# Patient Record
Sex: MALE | Race: WHITE | NOT HISPANIC OR LATINO | Employment: OTHER | URBAN - METROPOLITAN AREA
[De-identification: names, ages, dates, MRNs, and addresses within clinical notes are randomized per-mention and may not be internally consistent; named-entity substitution may affect disease eponyms.]

---

## 2020-01-28 ENCOUNTER — TRANSFERRED RECORDS (OUTPATIENT)
Dept: HEALTH INFORMATION MANAGEMENT | Facility: CLINIC | Age: 60
End: 2020-01-28

## 2020-02-26 ENCOUNTER — HOSPITAL ENCOUNTER (OUTPATIENT)
Dept: GENERAL RADIOLOGY | Facility: CLINIC | Age: 60
Discharge: HOME | End: 2020-02-26
Attending: OTOLARYNGOLOGY | Admitting: OTOLARYNGOLOGY
Payer: COMMERCIAL

## 2020-02-26 ENCOUNTER — TRANSFERRED RECORDS (OUTPATIENT)
Dept: HEALTH INFORMATION MANAGEMENT | Facility: CLINIC | Age: 60
End: 2020-02-26

## 2020-02-26 DIAGNOSIS — R42 DIZZINESS: ICD-10-CM

## 2020-02-26 PROCEDURE — 70120 X-RAY EXAM OF MASTOIDS: CPT

## 2020-02-28 ENCOUNTER — TRANSFERRED RECORDS (OUTPATIENT)
Dept: HEALTH INFORMATION MANAGEMENT | Facility: CLINIC | Age: 60
End: 2020-02-28

## 2020-06-04 ENCOUNTER — TRANSFERRED RECORDS (OUTPATIENT)
Dept: SURGERY | Facility: CLINIC | Age: 60
End: 2020-06-04

## 2020-06-10 ENCOUNTER — TRANSFERRED RECORDS (OUTPATIENT)
Dept: SURGERY | Facility: CLINIC | Age: 60
End: 2020-06-10

## 2020-06-12 ENCOUNTER — TRANSFERRED RECORDS (OUTPATIENT)
Dept: SURGERY | Facility: CLINIC | Age: 60
End: 2020-06-12

## 2020-07-27 ENCOUNTER — TRANSFERRED RECORDS (OUTPATIENT)
Dept: SURGERY | Facility: CLINIC | Age: 60
End: 2020-07-27

## 2020-08-18 ENCOUNTER — TRANSFERRED RECORDS (OUTPATIENT)
Dept: SURGERY | Facility: CLINIC | Age: 60
End: 2020-08-18

## 2021-06-18 ENCOUNTER — HOSPITAL ENCOUNTER (EMERGENCY)
Facility: CLINIC | Age: 61
Discharge: HOME | End: 2021-06-18
Attending: NURSE PRACTITIONER | Admitting: NURSE PRACTITIONER
Payer: COMMERCIAL

## 2021-06-18 VITALS
OXYGEN SATURATION: 99 % | HEART RATE: 88 BPM | WEIGHT: 175 LBS | BODY MASS INDEX: 21.76 KG/M2 | RESPIRATION RATE: 16 BRPM | DIASTOLIC BLOOD PRESSURE: 96 MMHG | SYSTOLIC BLOOD PRESSURE: 150 MMHG | HEIGHT: 75 IN | TEMPERATURE: 98.1 F

## 2021-06-18 DIAGNOSIS — R33.9 URINARY RETENTION: ICD-10-CM

## 2021-06-18 PROBLEM — K50.90 INFLAMMATORY BOWEL DISEASE (CROHN'S DISEASE) (H): Status: ACTIVE | Noted: 2021-02-22

## 2021-06-18 LAB
ALBUMIN UR-MCNC: 30 MG/DL
APPEARANCE UR: CLEAR
BILIRUB UR QL STRIP: NEGATIVE
COLOR UR AUTO: YELLOW
GLUCOSE UR STRIP-MCNC: NEGATIVE MG/DL
HGB UR QL STRIP: NEGATIVE
HYALINE CASTS #/AREA URNS LPF: 7 /LPF (ref 0–2)
KETONES UR STRIP-MCNC: 20 MG/DL
LEUKOCYTE ESTERASE UR QL STRIP: NEGATIVE
MUCOUS THREADS #/AREA URNS LPF: PRESENT /LPF
NITRATE UR QL: NEGATIVE
PH UR STRIP: 6.5 PH (ref 5–7)
RBC #/AREA URNS AUTO: 1 /HPF (ref 0–2)
SOURCE: ABNORMAL
SP GR UR STRIP: 1.03 (ref 1–1.03)
SQUAMOUS #/AREA URNS AUTO: <1 /HPF (ref 0–1)
UROBILINOGEN UR STRIP-MCNC: 2 MG/DL (ref 0–2)
WBC #/AREA URNS AUTO: <1 /HPF (ref 0–5)

## 2021-06-18 PROCEDURE — 99283 EMERGENCY DEPT VISIT LOW MDM: CPT

## 2021-06-18 PROCEDURE — 51702 INSERT TEMP BLADDER CATH: CPT

## 2021-06-18 PROCEDURE — 81001 URINALYSIS AUTO W/SCOPE: CPT | Performed by: EMERGENCY MEDICINE

## 2021-06-18 ASSESSMENT — ENCOUNTER SYMPTOMS
VOMITING: 0
DIFFICULTY URINATING: 1
ABDOMINAL PAIN: 0
FEVER: 0
CHILLS: 0
NAUSEA: 0

## 2021-06-18 ASSESSMENT — MIFFLIN-ST. JEOR: SCORE: 1694.42

## 2021-06-18 NOTE — ED TRIAGE NOTES
Presents with 3 days of urinary retention. Pt reports full bladder sensation and pain. Denies prostate or urinary hx. VSS on RA.

## 2021-06-19 ENCOUNTER — HOSPITAL ENCOUNTER (EMERGENCY)
Facility: CLINIC | Age: 61
Discharge: HOME | End: 2021-06-19
Attending: EMERGENCY MEDICINE | Admitting: EMERGENCY MEDICINE
Payer: COMMERCIAL

## 2021-06-19 ENCOUNTER — NURSE TRIAGE (OUTPATIENT)
Dept: NURSING | Facility: CLINIC | Age: 61
End: 2021-06-19

## 2021-06-19 VITALS
RESPIRATION RATE: 18 BRPM | TEMPERATURE: 97.9 F | HEART RATE: 63 BPM | SYSTOLIC BLOOD PRESSURE: 146 MMHG | DIASTOLIC BLOOD PRESSURE: 94 MMHG | OXYGEN SATURATION: 98 %

## 2021-06-19 DIAGNOSIS — R82.90 ABNORMAL URINALYSIS: ICD-10-CM

## 2021-06-19 DIAGNOSIS — R33.9 URINARY RETENTION: ICD-10-CM

## 2021-06-19 LAB
ALBUMIN UR-MCNC: 20 MG/DL
APPEARANCE UR: CLEAR
BILIRUB UR QL STRIP: NEGATIVE
COLOR UR AUTO: YELLOW
GLUCOSE UR STRIP-MCNC: NEGATIVE MG/DL
HGB UR QL STRIP: ABNORMAL
KETONES UR STRIP-MCNC: NEGATIVE MG/DL
LEUKOCYTE ESTERASE UR QL STRIP: ABNORMAL
MUCOUS THREADS #/AREA URNS LPF: PRESENT /LPF
NITRATE UR QL: NEGATIVE
PH UR STRIP: 8 PH (ref 5–7)
RBC #/AREA URNS AUTO: 165 /HPF (ref 0–2)
SOURCE: ABNORMAL
SP GR UR STRIP: 1.02 (ref 1–1.03)
UROBILINOGEN UR STRIP-MCNC: 2 MG/DL (ref 0–2)
WBC #/AREA URNS AUTO: 8 /HPF (ref 0–5)

## 2021-06-19 PROCEDURE — 99283 EMERGENCY DEPT VISIT LOW MDM: CPT

## 2021-06-19 PROCEDURE — 250N000013 HC RX MED GY IP 250 OP 250 PS 637: Performed by: EMERGENCY MEDICINE

## 2021-06-19 PROCEDURE — 87086 URINE CULTURE/COLONY COUNT: CPT | Performed by: EMERGENCY MEDICINE

## 2021-06-19 PROCEDURE — 81001 URINALYSIS AUTO W/SCOPE: CPT | Performed by: EMERGENCY MEDICINE

## 2021-06-19 RX ORDER — CEPHALEXIN 500 MG/1
500 CAPSULE ORAL 3 TIMES DAILY
Qty: 21 CAPSULE | Refills: 0 | Status: SHIPPED | OUTPATIENT
Start: 2021-06-19 | End: 2021-06-26

## 2021-06-19 RX ORDER — CEPHALEXIN 500 MG/1
500 CAPSULE ORAL ONCE
Status: COMPLETED | OUTPATIENT
Start: 2021-06-19 | End: 2021-06-19

## 2021-06-19 RX ADMIN — CEPHALEXIN 500 MG: 500 CAPSULE ORAL at 15:13

## 2021-06-19 ASSESSMENT — ENCOUNTER SYMPTOMS
FEVER: 0
NAUSEA: 0
ABDOMINAL PAIN: 0
VOMITING: 0
DIFFICULTY URINATING: 1

## 2021-06-19 NOTE — ED PROVIDER NOTES
History   Chief Complaint:  Sorensen Catheter Problem     HPI   Prashanth Hernandez is a 60 year old male who presents for evaluation of a Sorensen catheter problem. On 6/18 the patient was seen here in the ED for 4-5 days of urinary retention at which time a Sorensen catheter was placed and he was discharged with instructions to follow up with urology next week. This morning he noticed some blood coming out of the tip of his penis and later in the morning he started to have abnormal discharge form the penis, prompting him to come into the ED today. He reports that he has otherwise been feeling well and he denies any fever, nausea, vomiting, or abdominal pain.     Review of Systems   Constitutional: Negative for fever.   Gastrointestinal: Negative for abdominal pain, nausea and vomiting.   Genitourinary: Positive for difficulty urinating and discharge.   All other systems reviewed and are negative.      Allergies:  No known drug allergies      Medications:  Atarax  Lotrisone  Miralax  Valium    Past Medical History:    Crohn's disease   Small bowel obstruction      Past Surgical History:    Appendectomy  Laparotomy exploratory  Small bowel resection       Social History:  The patient presents to the ED alone.   The patient is a .     Physical Exam     Patient Vitals for the past 24 hrs:   BP Temp Pulse Resp SpO2   06/19/21 1209 (!) 146/94 97.9  F (36.6  C) 63 18 98 %       Physical Exam    Eyes:    Conjunctiva normal  Neck:    Supple, no meningismus.     CV:     Regular rate and rhythm.      No murmurs, rubs or gallops.     No lower extremity edema.  PULM:    Clear to auscultation bilateral.       No respiratory distress.      Good air exchange.  ABD:    Soft, non-distended.       No abdominal tenderness.     Bowel sounds normal.     No rebound, guarding or rigidity.     No CVA tenderness.   :   Sorensen catheter in place     Small amount of dried blood around the urethral opening      No purulent discharge  MSK:      No gross deformity to all four extremities.   LYMPH:   No cervical lymphadenopathy.  NEURO:   Alert.  Good muscular tone, no atrophy.   Skin:    Warm, dry and intact.    Psych:    Mood is good and affect is appropriate.      Emergency Department Course      Laboratory:  UA with Microscopic: Moderate blood, pH 8.0 high, Protein albumin 10, Trace leukocyte esterase, WBC 8 high,  high, Mucous present, o/w WNL   Urine Culture: Pending     Emergency Department Course:  Reviewed:  I reviewed nursing notes, vitals and past medical history    Assessments:  1342:  I obtained history and examined the patient as noted above.     1505: I updated and reassessed the patient.      Disposition:  The patient was discharged to home.     Impression & Plan     Medical Decision Makin-year-old male recently seen in the ED for urinary retention status post Sorensen catheter placement presents with concerns of mild bleeding and self-reports of purulent discharge at the urethral opening.  Catheter appears to be working appropriately.  No evidence of Sorensen catheter malfunction.  Urinalysis is equivocal with hematuria and a few white blood cells.  Given the novel symptoms, I will cover for early developing UTI with cephalexin pending urine culture.  If urine culture negative, patient may discontinue antibiotics.  Follow-up with urology as previously scheduled.     Diagnosis:    ICD-10-CM   1. Urinary retention  R33.9   2. Abnormal urinalysis  R82.90      Discharge Medications:  New Prescriptions    CEPHALEXIN (KEFLEX) 500 MG CAPSULE    Take 1 capsule (500 mg) by mouth 3 times daily for 7 days       Scribe Disclosure:  YI, Geovanni Galeana, am serving as a scribe at 1:42 PM on 2021 to document services personally performed by Kenneth Jones MD based on my observations and the provider's statements to me.         Kenneth Jones MD  [id number]

## 2021-06-19 NOTE — TELEPHONE ENCOUNTER
Patient says he was seen in the ED yesterday and given a catheter. He noticed some wetness yesterday; today there's some dried blood and minor discomfort. Catheter seems to be working -- reports steady increase of urine in bag.     Disposition: advised be seen w/i 4  Hrs.    Advised patient to be seen in ED or urgency room.   Reviewed care advice with caller per RN triage protocol guideline.  Advised to call back with worsening symptoms, concerns or questions. Caller verbalized understanding and says will monitor and go in if bleeding is worse.          Reason for Disposition    Bleeding around catheter (e.g., from penis or female urethra)    Additional Information    Negative: Shock suspected (e.g., cold/pale/clammy skin, too weak to stand, low BP, rapid pulse)    Negative: Sounds like a life-threatening emergency to the triager    Negative: [1] Catheter was accidentally pulled-out AND [2] bright red continuous bleeding    Negative: SEVERE abdominal pain    Negative: Fever > 100.4 F (38.0 C)    Negative: [1] Drinking very little AND [2] dehydration suspected (e.g., no urine > 12 hours, very dry mouth, very lightheaded)    Negative: Patient sounds very sick or weak to the triager    Negative: Catheter was accidentally pulled-out    Negative: [1] Catheter is broken AND [2] is not usable    Protocols used: URINARY CATHETER SYMPTOMS AND [id number]

## 2021-06-19 NOTE — ED TRIAGE NOTES
Pt was seen yesterday in the ED for urinary retention and was sent home with an indwelling blevins. Today pt noticed blood and pus on the head of his penis that's coming out around the tube. ABCs intact.

## 2021-06-19 NOTE — ED PROVIDER NOTES
"  History   Chief Complaint:  Urinary Retention       HPI   Prashanth Hernandez is a 60 year old male with history of Crohn's disease and small bowel obstruction who presents with urinary retention. He mentions for the past 4-5 days he has been having a difficult time producing urine. Today the discomfort had gotten worse. He is a  so he is active and drinks lots of water but does not produce urine. He mentions having anal swelling which he has gone to many hospitals and all had said different things. In the last year, he has had many digestive issues. He denies abdominal pain.     Review of Systems   Constitutional: Negative for chills and fever.   Gastrointestinal: Negative for abdominal pain, nausea and vomiting.   Genitourinary: Positive for decreased urine volume and difficulty urinating.   All other systems reviewed and are negative.    Allergies:  The patient has no known allergies.     Medications:  Augmentin  Dilaudid  Atarax  Lotrisone  Miralax  Valium    Past Medical History:    Crohn's disease  Small bowel obstruction    Past Surgical History:    Appendectomy  Laparotomy   Small bowel resection    Social History:  The patent presents alone.    Physical Exam     Patient Vitals for the past 24 hrs:   BP Temp Temp src Pulse Resp SpO2 Height Weight   06/18/21 2110 -- -- -- 88 -- 99 % -- --   06/18/21 1743 (!) 150/96 98.1  F (36.7  C) Temporal 73 16 100 % 1.905 m (6' 3\") 79.4 kg (175 lb)       Physical Exam  General: Alert, No obvious discomfort, well kept  Eyes: PERRL, conjunctivae pink no scleral icterus or conjunctival injection  ENT:   Moist mucus membranes, posterior oropharynx clear without erythema or exudates, No lymphadenopathy, Normal voice  Resp:  Lungs clear to auscultation bilaterally, no crackles/rubs/wheezes. Good air movement  CV:  Normal rate and rhythm, no murmurs/rubs/gallops  GI:  Abdomen soft and non-distended.  Normoactive BS.  No tenderness, guarding or rebound, No masses  Skin: "  Warm, dry.  No rashes or petechiae  Musculoskeletal: No peripheral edema or calf tenderness, Normal gross ROM   Neuro: Alert and oriented to person/place/time, normal sensation  Psychiatric: Normal affect, cooperative, good eye contact    Emergency Department Course     Laboratory:  UA with microscopic: Ketons: 20 (A), Protein Albumin: 30 (A), Mucous: Present (A), Hyaline: 7(H) o/w WNL        Emergency Department Course:    Reviewed:  I reviewed nursing notes, vitals, past medical history and care everywhere    Assessments:  2010 I obtained history and examined the patient as noted above.     2051 I rechecked the patient and explained findings.       Disposition:  The patient was discharged to home.       Impression & Plan     Medical Decision Making:  Prashanth Hernandez is a 60 year old male who presents today for evaluation of urinary retention.  He states over the last week or so he has had increasing difficulty urinating and particularly over the last 3 days.  Today he has been unable to empty his bladder and therefore presented for evaluation.  He does not have a known history of BPH or other urinary problems but does have some intestinal issues.  He uses a stool softener for this.  Does not feel constipated at this time.  Prior to my evaluation he had already had a Sorensen catheter placed and 900 cc of urine was drained.  Patient had no symptoms on my evaluation.  There was no signs of urinary tract infection.  Unclear as to the source of his retention at this time.  Plan will be to leave Sorensen catheter in place until he can follow-up with urology.  Patient is comfortable with this plan.  No indication for antibiotics.  Note further testing or imaging indicated.  He appears to be safe and appropriate for outpatient management follow-up and is discharged home.    Diagnosis:    ICD-10-CM    1. Urinary retention  R33.9        Scribe Disclosure:  Sabrina RICHMOND, am serving as a scribe at 8:10 PM on 6/18/2021  to document services personally performed by Ronen Patterson APRN based on my observations and the provider's statements to me.          Ronen Patterson APRN CNP  06/18/21 [id number]

## 2021-06-20 LAB
BACTERIA SPEC CULT: NO GROWTH
Lab: NORMAL
SPECIMEN SOURCE: NORMAL

## 2021-06-21 ENCOUNTER — NURSE TRIAGE (OUTPATIENT)
Dept: NURSING | Facility: CLINIC | Age: 61
End: 2021-06-21

## 2021-06-21 NOTE — TELEPHONE ENCOUNTER
Patient seen at  ER on Friday for retention of urine. A catheter was placed, and patient was advised to follow up with Urologist per ER @ [facility].    Patient calling today, and states he is unable to see urologist for 2 weeks. He has his PCP at [facility], and he was advised to call [facility] , and see if they could get him in sooner, and maybe also see Urologist sooner.  Patient also concerned because of the expense to go back to ER to get catheter removed.    Patient agreed with POC. And states he will call his PCP now.    Allie Bullard, RN RN  [facility] Triage/refill nurse    Reason for Disposition    Patient wants to be seen    Protocols used: URINARY CATHETER SYMPTOMS AND [id number]

## 2021-06-22 ENCOUNTER — TELEPHONE (OUTPATIENT)
Dept: EMERGENCY MEDICINE | Facility: CLINIC | Age: 61
End: 2021-06-22

## 2021-06-22 NOTE — TELEPHONE ENCOUNTER
"[facility] Emergency Department Lab result notification:    Reason for call  No Growth urine culture treated  Lab Result  Final urine culture report shows \"NO GROWTH\" and is NEGATIVE.  [facility] Emergency Dept discharge antibiotic: Cephalexin (Keflex) 500 mg capsule, 1 capsule (500 mg) by mouth 3 times daily for 7 days.  [facility] Emergency Dept discharge Rx antibiotic for UTI only (Yes/No): Yes  Patient took antibiotic within 3 days prior to urine culture collection (Yes/no): No  Recommendations in treatment per [facility] ED Lab result Urine culture protocol.  ED visit Date: 6/19/21  Symptoms reported at ED visit Urinary Retention        HPI   Prashanth Hernandez is a 60 year old male with history of Crohn's disease and small bowel obstruction who presents with urinary retention. He mentions for the past 4-5 days he has been having a difficult time producing urine. Today the discomfort had gotten worse. He is a  so he is active and drinks lots of water but does not produce urine. He mentions having anal swelling which he has gone to many hospitals and all had said different things. In the last year, he has had many digestive issues. He denies abdominal pain.    Miscellaneous information      Current symptoms  9:52 Spoke with patient and did review the results with him. He does have an appointment with his primary clinic [facility] today and will discuss his result and any need for continuing treatment with that provider.  Recommendations/Instructions  Patient notified of lab result and treatment recommendations.    Contact your PCP clinic or return to the Emergency department if your:    Symptoms return.    Symptoms worsen or other concerning symptom's.    Ruthann Pratt  [facility] [employer]  Emergency Dept Lab Result RN  Ph# [phone]       "

## 2021-10-15 ENCOUNTER — TRANSFERRED RECORDS (OUTPATIENT)
Dept: SURGERY | Facility: CLINIC | Age: 61
End: 2021-10-15

## 2022-03-14 ENCOUNTER — TRANSFERRED RECORDS (OUTPATIENT)
Dept: SURGERY | Facility: CLINIC | Age: 62
End: 2022-03-14
Payer: COMMERCIAL

## 2022-03-18 ENCOUNTER — HOSPITAL ENCOUNTER (OUTPATIENT)
Dept: CT IMAGING | Facility: CLINIC | Age: 62
Discharge: HOME | End: 2022-03-18
Attending: INTERNAL MEDICINE | Admitting: INTERNAL MEDICINE
Payer: COMMERCIAL

## 2022-03-18 DIAGNOSIS — Z87.19 HISTORY OF CROHN'S DISEASE: ICD-10-CM

## 2022-03-18 DIAGNOSIS — R14.0 BLOATING: ICD-10-CM

## 2022-03-18 PROCEDURE — 250N000011 HC RX IP 250 OP 636: Performed by: INTERNAL MEDICINE

## 2022-03-18 PROCEDURE — 250N000009 HC RX 250: Performed by: INTERNAL MEDICINE

## 2022-03-18 PROCEDURE — 74177 CT ABD & PELVIS W/CONTRAST: CPT

## 2022-03-18 RX ORDER — IOPAMIDOL 755 MG/ML
500 INJECTION, SOLUTION INTRAVASCULAR ONCE
Status: COMPLETED | OUTPATIENT
Start: 2022-03-18 | End: 2022-03-18

## 2022-03-18 RX ADMIN — SODIUM CHLORIDE 59 ML: 9 INJECTION, SOLUTION INTRAVENOUS at 09:30

## 2022-03-18 RX ADMIN — IOPAMIDOL 88 ML: 755 INJECTION, SOLUTION INTRAVENOUS at 09:30

## 2023-09-19 ENCOUNTER — APPOINTMENT (OUTPATIENT)
Dept: MRI IMAGING | Facility: CLINIC | Age: 63
End: 2023-09-19
Attending: PHYSICIAN ASSISTANT
Payer: COMMERCIAL

## 2023-09-19 ENCOUNTER — HOSPITAL ENCOUNTER (EMERGENCY)
Facility: CLINIC | Age: 63
Discharge: HOME | End: 2023-09-19
Attending: PHYSICIAN ASSISTANT | Admitting: PHYSICIAN ASSISTANT
Payer: COMMERCIAL

## 2023-09-19 VITALS
OXYGEN SATURATION: 100 % | RESPIRATION RATE: 16 BRPM | SYSTOLIC BLOOD PRESSURE: 143 MMHG | HEART RATE: 62 BPM | TEMPERATURE: 98.5 F | DIASTOLIC BLOOD PRESSURE: 71 MMHG

## 2023-09-19 DIAGNOSIS — R42 DIZZINESS: ICD-10-CM

## 2023-09-19 LAB
ANION GAP SERPL CALCULATED.3IONS-SCNC: 7 MMOL/L (ref 7–15)
BASOPHILS # BLD AUTO: 0 10E3/UL (ref 0–0.2)
BASOPHILS NFR BLD AUTO: 1 %
BUN SERPL-MCNC: 8.7 MG/DL (ref 8–23)
CALCIUM SERPL-MCNC: 9.9 MG/DL (ref 8.8–10.2)
CHLORIDE SERPL-SCNC: 105 MMOL/L (ref 98–107)
CREAT SERPL-MCNC: 0.81 MG/DL (ref 0.67–1.17)
DEPRECATED HCO3 PLAS-SCNC: 29 MMOL/L (ref 22–29)
EGFRCR SERPLBLD CKD-EPI 2021: >90 ML/MIN/1.73M2
EOSINOPHIL # BLD AUTO: 0.1 10E3/UL (ref 0–0.7)
EOSINOPHIL NFR BLD AUTO: 1 %
ERYTHROCYTE [DISTWIDTH] IN BLOOD BY AUTOMATED COUNT: 11.9 % (ref 10–15)
GLUCOSE SERPL-MCNC: 115 MG/DL (ref 70–99)
HCT VFR BLD AUTO: 41.7 % (ref 40–53)
HGB BLD-MCNC: 14.7 G/DL (ref 13.3–17.7)
HOLD SPECIMEN: NORMAL
HOLD SPECIMEN: NORMAL
IMM GRANULOCYTES # BLD: 0 10E3/UL
IMM GRANULOCYTES NFR BLD: 0 %
LYMPHOCYTES # BLD AUTO: 1.1 10E3/UL (ref 0.8–5.3)
LYMPHOCYTES NFR BLD AUTO: 24 %
MCH RBC QN AUTO: 31.9 PG (ref 26.5–33)
MCHC RBC AUTO-ENTMCNC: 35.3 G/DL (ref 31.5–36.5)
MCV RBC AUTO: 91 FL (ref 78–100)
MONOCYTES # BLD AUTO: 0.4 10E3/UL (ref 0–1.3)
MONOCYTES NFR BLD AUTO: 8 %
NEUTROPHILS # BLD AUTO: 2.9 10E3/UL (ref 1.6–8.3)
NEUTROPHILS NFR BLD AUTO: 66 %
NRBC # BLD AUTO: 0 10E3/UL
NRBC BLD AUTO-RTO: 0 /100
PLATELET # BLD AUTO: 207 10E3/UL (ref 150–450)
POTASSIUM SERPL-SCNC: 4.4 MMOL/L (ref 3.4–5.3)
RBC # BLD AUTO: 4.61 10E6/UL (ref 4.4–5.9)
SODIUM SERPL-SCNC: 141 MMOL/L (ref 136–145)
WBC # BLD AUTO: 4.4 10E3/UL (ref 4–11)

## 2023-09-19 PROCEDURE — 70553 MRI BRAIN STEM W/O & W/DYE: CPT

## 2023-09-19 PROCEDURE — 93005 ELECTROCARDIOGRAM TRACING: CPT

## 2023-09-19 PROCEDURE — 250N000013 HC RX MED GY IP 250 OP 250 PS 637: Performed by: STUDENT IN AN ORGANIZED HEALTH CARE EDUCATION/TRAINING PROGRAM

## 2023-09-19 PROCEDURE — 70549 MR ANGIOGRAPH NECK W/O&W/DYE: CPT

## 2023-09-19 PROCEDURE — 36415 COLL VENOUS BLD VENIPUNCTURE: CPT | Performed by: STUDENT IN AN ORGANIZED HEALTH CARE EDUCATION/TRAINING PROGRAM

## 2023-09-19 PROCEDURE — 70544 MR ANGIOGRAPHY HEAD W/O DYE: CPT | Mod: XU

## 2023-09-19 PROCEDURE — 255N000002 HC RX 255 OP 636: Performed by: PHYSICIAN ASSISTANT

## 2023-09-19 PROCEDURE — 80048 BASIC METABOLIC PNL TOTAL CA: CPT | Performed by: STUDENT IN AN ORGANIZED HEALTH CARE EDUCATION/TRAINING PROGRAM

## 2023-09-19 PROCEDURE — A9585 GADOBUTROL INJECTION: HCPCS | Performed by: PHYSICIAN ASSISTANT

## 2023-09-19 PROCEDURE — 85004 AUTOMATED DIFF WBC COUNT: CPT | Performed by: STUDENT IN AN ORGANIZED HEALTH CARE EDUCATION/TRAINING PROGRAM

## 2023-09-19 PROCEDURE — 99285 EMERGENCY DEPT VISIT HI MDM: CPT | Mod: 25

## 2023-09-19 RX ORDER — MECLIZINE HYDROCHLORIDE 25 MG/1
25 TABLET ORAL ONCE
Status: COMPLETED | OUTPATIENT
Start: 2023-09-19 | End: 2023-09-19

## 2023-09-19 RX ORDER — GADOBUTROL 604.72 MG/ML
10 INJECTION INTRAVENOUS ONCE
Status: COMPLETED | OUTPATIENT
Start: 2023-09-19 | End: 2023-09-19

## 2023-09-19 RX ADMIN — MECLIZINE HYDROCHLORIDE 25 MG: 25 TABLET ORAL at 15:53

## 2023-09-19 RX ADMIN — GADOBUTROL 10 ML: 604.72 INJECTION INTRAVENOUS at 18:36

## 2023-09-19 ASSESSMENT — ACTIVITIES OF DAILY LIVING (ADL)
ADLS_ACUITY_SCORE: 35
ADLS_ACUITY_SCORE: 35

## 2023-09-19 NOTE — ED TRIAGE NOTES
"PT reports that he has been having intermittent dizziness, blurred vision and nausea for the last year, pt reports that he called the triage line today due to \"feeling kind of out of it\" and went to UC and they referred pt to ED. PT BEFAST negative. PT reports that drinking gatorade that has been helping with these symptoms. PT VSS and ABC's intact        "

## 2023-09-19 NOTE — ED PROVIDER NOTES
History     Chief Complaint:  Dizziness and Eye Problem       HPI   Prashanth Hernandez is an active 63 year old male with history of Chron's disease who presents from Urgent Care with unsteadiness on his feet and blurred vision that has been ongoing for the past year. He describes the blurred vision/diplopia as seeing letters on top of each other on a street sign. The symptoms have been getting progressively worse over the past 2-3 months. He started feeling symptoms today between 3110-3242. The symptoms get worse once he starts moving around in the morning. He reports experiencing nausea, light sensitivity, and once in a while tingling in his arms. He reports that physical activity tends to make the symptoms better. He talked to his PCP who believes that his symptoms are a result of his medications. He wakes up intermittently with headaches, but denies room spinning dizziness, seeing a neurologist or other specialists, having an MRI done in the past year, chest pain, smoking, hypertension, or hypercholesteremia, family history of brain cancer, fevers, or chills.  He also notes for at least the last year at times he will wake up gasping for breath feeling like he stopped breathing momentarily.  This is being followed by his primary care doctor and he has had sleep studies done about 3 years ago.  He has not seen a pulmonologist.  Currently not having active chest pain or difficulty breathing at this time.    Independent Historian:    The patient provided the history noted above.    Review of External Notes:        Medications:    Decadron  Valium  Remeron   Flomax   Dilaudid  Augmentin    Past Medical History:    Chron's disease    Past Surgical History:    Appendectomy  Laparotomy  Small bowel resection      Physical Exam   Patient Vitals for the past 24 hrs:   BP Temp Temp src Pulse Resp SpO2   09/19/23 2015 (!) 143/71 -- -- 62 16 100 %   09/19/23 1549 (!) 162/118 98.5  F (36.9  C) Temporal 83 18 99 %       Physical Exam  Vitals and nursing note reviewed.   Constitutional:       Appearance: He is not diaphoretic.   HENT:      Head: Atraumatic.      Right Ear: Tympanic membrane, ear canal and external ear normal.      Left Ear: Tympanic membrane, ear canal and external ear normal.      Nose: Nose normal. No congestion or rhinorrhea.      Mouth/Throat:      Mouth: Mucous membranes are moist.      Pharynx: Oropharynx is clear.   Eyes:      General: No scleral icterus.        Right eye: No discharge.         Left eye: No discharge.      Extraocular Movements: Extraocular movements intact.      Conjunctiva/sclera: Conjunctivae normal.      Pupils: Pupils are equal, round, and reactive to light.   Cardiovascular:      Rate and Rhythm: Normal rate and regular rhythm.      Heart sounds: Normal heart sounds. No murmur heard.     No friction rub. No gallop.   Pulmonary:      Effort: No respiratory distress.      Breath sounds: Normal breath sounds. No stridor. No wheezing, rhonchi or rales.   Abdominal:      General: There is no distension.      Palpations: Abdomen is soft.      Tenderness: There is no abdominal tenderness.   Musculoskeletal:         General: No swelling.      Cervical back: Normal range of motion and neck supple. No tenderness.      Right lower leg: No edema.      Left lower leg: No edema.   Lymphadenopathy:      Cervical: No cervical adenopathy.   Skin:     General: Skin is warm.      Capillary Refill: Capillary refill takes less than 2 seconds.      Coloration: Skin is not jaundiced or pale.   Neurological:      Mental Status: He is alert and oriented to person, place, and time. Mental status is at baseline.      GCS: GCS eye subscore is 4. GCS verbal subscore is 5. GCS motor subscore is 6.      Cranial Nerves: No cranial nerve deficit, dysarthria or facial asymmetry.      Sensory: Sensation is intact. No sensory deficit.      Motor: Motor function is intact. No weakness, tremor or pronator drift.       Coordination: Romberg sign negative. Coordination normal. Finger-Nose-Finger Test normal.      Gait: Gait is intact.      Comments: Myotomes : L1-S1, C5-T1 intact bialterally 5/5 strength. Equal sensation of the face, arms , legs.   Psychiatric:         Mood and Affect: Mood normal.         Behavior: Behavior normal.         Thought Content: Thought content normal.           Emergency Department Course   ECG  ECG taken at 1600, ECG read at 1657  Normal sinus rhythm  Normal ECG   Rate 60 bpm. MO interval 148 ms. QRS duration 98 ms. QT/QTc 418/418 ms. P-R-T axes 86 73 71.    Imaging:  MR Brain w/o & w Contrast    (Results Pending)   MRA Neck (Carotids) wo & w Contrast    (Results Pending)   MRA Brain ([place]) wo Contrast    (Results Pending)     Report per radiology    Laboratory:  Labs Ordered and Resulted from Time of ED Arrival to Time of ED Departure   BASIC METABOLIC PANEL - Abnormal       Result Value    Sodium 141      Potassium 4.4      Chloride 105      Carbon Dioxide (CO2) 29      Anion Gap 7      Urea Nitrogen 8.7      Creatinine 0.81      Calcium 9.9      Glucose 115 (*)     GFR Estimate >90     CBC WITH PLATELETS AND DIFFERENTIAL    WBC Count 4.4      RBC Count 4.61      Hemoglobin 14.7      Hematocrit 41.7      MCV 91      MCH 31.9      MCHC 35.3      RDW 11.9      Platelet Count 207      % Neutrophils 66      % Lymphocytes 24      % Monocytes 8      % Eosinophils 1      % Basophils 1      % Immature Granulocytes 0      NRBCs per 100 WBC 0      Absolute Neutrophils 2.9      Absolute Lymphocytes 1.1      Absolute Monocytes 0.4      Absolute Eosinophils 0.1      Absolute Basophils 0.0      Absolute Immature Granulocytes 0.0      Absolute NRBCs 0.0        Emergency Department Course & Assessments:       Interventions:  Medications   sodium chloride (PF) 0.9% PF flush 100 mL (has no administration in time range)   meclizine (ANTIVERT) tablet 25 mg (25 mg Oral $Given 9/19/23 6479)   gadobutrol  (GADAVIST) injection 10 mL (10 mLs Intravenous $Given 9/19/23 1836)      Assessments:  1636 I obtained history and examined the patient as noted above.   2040 The patient wants to leave before his results were back. I deemed the patient safe to discharge home.    Independent Interpretation (X-rays, CTs, rhythm strip):  None    Consultations/Discussion of Management or Tests:  None  ED Course as of 09/20/23 0033   Wed Sep 20, 2023   0018 Left message regarding MRI results with the patient on his voice mail       Social Determinants of Health affecting care:  None     Disposition:  The patient was discharged to home.     Impression & Plan    CMS Diagnoses: None    Medical Decision Making:  This is a 61-year-old male who presents with chronic headaches and dizziness.  Patient requested MRI imaging today.  MRI imaging was obtained however the patient could not stay for the final results.  MRI imaging essentially shows no evidence of stroke, cervical dissection or aneurysm.  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  It is unclear if this is actually causing his symptoms however I do recommend he get this followed up with through neurology or neurosurgery follow-up with his primary care.  Patient was unable to stay for the final results and had to leave prior to for the results coming back.  I called the patient and left detailed information on his voicemail regarding the results of his MRI.  His neuro exam was completely normal and his symptoms had resolved by the time I evaluated the patient.  I have low concern regarding this as a cardiac source..  Patient's EKG was normal.  His labs are normal.  Patient should return back to the emergency department if he develops worsening symptoms or new concerns.    Diagnosis:    ICD-10-CM    1. Dizziness  R42          Discharge Medications:  New Prescriptions    No medications on file      Scribe Disclosure:  Joseph RICHMOND, am serving as a scribe at  4:36 PM on 9/19/2023 to document services personally performed by Alex Ibanez PA-C based on my observations and the provider's statements to me.               Alex Ibanez PA-C  09/20/23 0039

## 2023-09-20 LAB
ATRIAL RATE - MUSE: 60 BPM
DIASTOLIC BLOOD PRESSURE - MUSE: NORMAL MMHG
INTERPRETATION ECG - MUSE: NORMAL
P AXIS - MUSE: 86 DEGREES
PR INTERVAL - MUSE: 148 MS
QRS DURATION - MUSE: 98 MS
QT - MUSE: 418 MS
QTC - MUSE: 418 MS
R AXIS - MUSE: 73 DEGREES
SYSTOLIC BLOOD PRESSURE - MUSE: NORMAL MMHG
T AXIS - MUSE: 71 DEGREES
VENTRICULAR RATE- MUSE: 60 BPM